# Patient Record
Sex: FEMALE | Race: WHITE | NOT HISPANIC OR LATINO | Employment: FULL TIME | URBAN - METROPOLITAN AREA
[De-identification: names, ages, dates, MRNs, and addresses within clinical notes are randomized per-mention and may not be internally consistent; named-entity substitution may affect disease eponyms.]

---
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
## 2022-01-10 ENCOUNTER — OFFICE VISIT (OUTPATIENT)
Dept: FAMILY MEDICINE CLINIC | Facility: CLINIC | Age: 22
End: 2022-01-10

## 2022-01-10 VITALS
HEART RATE: 90 BPM | SYSTOLIC BLOOD PRESSURE: 130 MMHG | HEIGHT: 68 IN | BODY MASS INDEX: 26.86 KG/M2 | TEMPERATURE: 96.8 F | WEIGHT: 177.2 LBS | DIASTOLIC BLOOD PRESSURE: 77 MMHG | OXYGEN SATURATION: 97 %

## 2022-01-10 DIAGNOSIS — F90.2 ATTENTION DEFICIT HYPERACTIVITY DISORDER (ADHD), COMBINED TYPE: ICD-10-CM

## 2022-01-10 DIAGNOSIS — Z30.09 ENCOUNTER FOR OTHER GENERAL COUNSELING AND ADVICE ON CONTRACEPTION: ICD-10-CM

## 2022-01-10 DIAGNOSIS — K31.84 GASTROPARESIS: Primary | ICD-10-CM

## 2022-01-10 DIAGNOSIS — Z84.81 FH: BRCA GENE POSITIVE: ICD-10-CM

## 2022-01-10 PROCEDURE — 99203 OFFICE O/P NEW LOW 30 MIN: CPT | Performed by: FAMILY MEDICINE

## 2022-01-10 RX ORDER — FAMOTIDINE 40 MG/1
40 TABLET, FILM COATED ORAL DAILY
Qty: 90 TABLET | Refills: 0 | Status: SHIPPED | OUTPATIENT
Start: 2022-01-10 | End: 2022-05-10

## 2022-01-10 NOTE — PROGRESS NOTES
"Chief Complaint  Contraception (having issues) and Vomiting (daily in mornings)    Subjective          Nadeem Hernández presents to [facility]  History of Present Illness came here to establish care and discuss contraception, patient also giving history of gastroparesis complaining of vomiting early morning.  She is sexually active took 2 urine pregnancy test at home and both are negative.  She had EGD done few years ago.  She also had a gastric emptying done at the same time.  She was taking erythromycin 4 times a day for gastroparesis.  Patient is also requesting removal of IUD.  She is also requesting a genetic testing for BRCA 2 gene.  Grandmother diagnosed with BRCA 2 genes and breast cancer.  She also has a history of endometrial hyperplasia and had D&C done at age 16.  Patient also wanted to discuss her symptoms of ADHD, history of ADHD was on Concerta.  Quit taking Concerta after school.  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  She is also complaining of increased anxiety denies any depression.  Family history of bipolar disorder.  Objective   Vital Signs:   /77   Pulse 90   Temp 96.8 °F (36 °C)   Ht 172.7 cm (67.99\")   Wt 80.4 kg (177 lb 3.2 oz)   SpO2 97%   BMI 26.95 kg/m²     Physical Exam  Constitutional:       General: She is not in acute distress.     Appearance: Normal appearance. She is well-developed.   HENT:      Head: Normocephalic and atraumatic.      Right Ear: Tympanic membrane normal.      Left Ear: Tympanic membrane normal.      Mouth/Throat:      Mouth: Mucous membranes are moist.   Eyes:      General:         Right eye: No discharge.         Left eye: No discharge.      Extraocular Movements: Extraocular movements intact.      Pupils: Pupils are equal, round, and reactive to light.   Cardiovascular:      Rate and Rhythm: Normal rate and regular rhythm.      Pulses: Normal pulses.      Heart " sounds: Normal heart sounds.   Pulmonary:      Effort: Pulmonary effort is normal.      Breath sounds: Normal breath sounds. No wheezing or rales.   Abdominal:      General: Bowel sounds are normal.      Palpations: Abdomen is soft. There is no mass.      Tenderness: There is no abdominal tenderness.   Musculoskeletal:      Cervical back: Normal range of motion and neck supple.      Right lower leg: No edema.      Left lower leg: No edema.   Lymphadenopathy:      Cervical: No cervical adenopathy.   Neurological:      General: No focal deficit present.      Mental Status: She is alert and oriented to person, place, and time.        Result Review :       Data reviewed: Consultant notes gi  and GI studies egd, gastric emptying          Assessment and Plan    Diagnoses and all orders for this visit:    1. Gastroparesis (Primary)    2. Encounter for other general counseling and advice on contraception  -     Ambulatory Referral to Obstetrics / Gynecology    3. Attention deficit hyperactivity disorder (ADHD), combined type    4. FH: BRCA gene positive  -     Ambulatory Referral to Genetics    Other orders  -     famotidine (Pepcid) 40 MG tablet; Take 1 tablet by mouth Daily.  Dispense: 90 tablet; Refill: 0         Nadeem Hernández is a 21-year-old female patient came here to establish care and follow-up on  Gastroparesis, causes of gastroparesis discussed with patient she already has a scheduled appointment with GI.   diet changes discussed with patient.  We will also start on Pepcid.  Contraceptive advised, patient requesting removal of IUD.  She has Mirena IUD  almost 5 years ago.  She is complaining of increased bloating.  I advised her to have her follow-up with OB to get it removed was also start some alternative method of contraception.  Patient is reluctant to start any other medication for now.  Family history of BRCA2, will refer for genetic testing.  ADHD, information on psychological testing given to patient.   She will come back after the psychological testing and evaluation.        Follow Up   No follow-ups on file.  Patient was given instructions and counseling regarding her condition or for health maintenance advice. Please see specific information pulled into the AVS if appropriate.

## 2022-02-02 ENCOUNTER — OFFICE VISIT (OUTPATIENT)
Dept: OBSTETRICS AND GYNECOLOGY | Age: 22
End: 2022-02-02

## 2022-02-02 VITALS
SYSTOLIC BLOOD PRESSURE: 110 MMHG | DIASTOLIC BLOOD PRESSURE: 68 MMHG | BODY MASS INDEX: 27.13 KG/M2 | HEIGHT: 68 IN | WEIGHT: 179 LBS

## 2022-02-02 DIAGNOSIS — Z11.3 SCREEN FOR STD (SEXUALLY TRANSMITTED DISEASE): ICD-10-CM

## 2022-02-02 DIAGNOSIS — Z01.419 WELL WOMAN EXAM WITH ROUTINE GYNECOLOGICAL EXAM: Primary | ICD-10-CM

## 2022-02-02 DIAGNOSIS — F17.210 CIGARETTE SMOKER: ICD-10-CM

## 2022-02-02 DIAGNOSIS — Z30.011 VISIT FOR ORAL CONTRACEPTIVE PRESCRIPTION: ICD-10-CM

## 2022-02-02 DIAGNOSIS — Z30.432 ENCOUNTER FOR IUD REMOVAL: ICD-10-CM

## 2022-02-02 DIAGNOSIS — Z80.41 FAMILY HISTORY OF OVARIAN CANCER: ICD-10-CM

## 2022-02-02 PROCEDURE — 99385 PREV VISIT NEW AGE 18-39: CPT | Performed by: NURSE PRACTITIONER

## 2022-02-02 PROCEDURE — 58301 REMOVE INTRAUTERINE DEVICE: CPT | Performed by: NURSE PRACTITIONER

## 2022-02-02 RX ORDER — ACETAMINOPHEN AND CODEINE PHOSPHATE 120; 12 MG/5ML; MG/5ML
1 SOLUTION ORAL DAILY
Qty: 28 TABLET | Refills: 12 | Status: SHIPPED | OUTPATIENT
Start: 2022-02-02 | End: 2023-03-17

## 2022-02-02 NOTE — PROGRESS NOTES
Subjective     Chief Complaint   Patient presents with   • Gynecologic Exam     last pap 12/2021 normal   • Procedure     Remove IUD       History of Present Illness    Nadeem Hernández is a 21 y.o. No obstetric history on file. who presents for annual exam.    New GYN  Here for annual exam  Pt working at [employer], going to start nursing at [employer]  Wants IUD removed - has had for 5 years. Discussed approval for 7 years.  She is SA currently, using condoms  Paternal grandmother has + BRCA gene and ovarian cancer  She was told she had a blood clotting d/o in the past  PCP has referred her to genetics  Rec progestin only options  No other GYN concerns or complaints today     Obstetric History:  OB History    No obstetric history on file.        Menstrual History:     Patient's last menstrual period was 01/19/2022.         Current contraception: IUD  History of abnormal Pap smear: no  Received Gardasil immunization: yes  Perform regular self breast exam: no  Family history of uterine or ovarian cancer: yes - see hx  Family History of colon cancer: no  Family history of breast cancer: no    Mammogram: not indicated.  Colonoscopy: not indicated.  DEXA: not indicated.    Exercise: moderately active  Calcium/Vitamin D: adequate intake    The following portions of the patient's history were reviewed and updated as appropriate: allergies, current medications, past family history, past medical history, past social history, past surgical history and problem list.    Review of Systems   Constitutional: Negative.    Respiratory: Negative.    Cardiovascular: Negative.    Gastrointestinal: Negative.    Genitourinary: Negative.    Skin: Negative.    Psychiatric/Behavioral: Negative.            Objective   Physical Exam  Constitutional:       General: She is awake.      Appearance: Normal appearance. She is well-developed.   HENT:      Head: Normocephalic and atraumatic.      Nose: Nose normal.   Neck:      Thyroid: No thyroid mass,  thyromegaly or thyroid tenderness.   Cardiovascular:      Rate and Rhythm: Normal rate and regular rhythm.      Pulses: Normal pulses.      Heart sounds: Normal heart sounds.   Pulmonary:      Effort: Pulmonary effort is normal.      Breath sounds: Normal breath sounds.   Chest:   Breasts: Breasts are symmetrical.      Right: Normal. No swelling, bleeding, inverted nipple, mass, nipple discharge, skin change, tenderness or supraclavicular adenopathy.      Left: Normal. No swelling, bleeding, inverted nipple, mass, nipple discharge, skin change, tenderness or supraclavicular adenopathy.       Abdominal:      General: Abdomen is flat. Bowel sounds are normal.      Palpations: Abdomen is soft.      Tenderness: There is no abdominal tenderness.   Genitourinary:     General: Normal vulva.      Labia:         Right: No rash, tenderness, lesion or injury.         Left: No rash, tenderness, lesion or injury.       Urethra: No prolapse, urethral pain, urethral swelling or urethral lesion.      Vagina: Normal. No signs of injury. No vaginal discharge, erythema, tenderness, bleeding, lesions or prolapsed vaginal walls.      Cervix: No discharge, friability, lesion, erythema or cervical bleeding.      Uterus: Normal. Not enlarged, not tender and no uterine prolapse.       Adnexa: Right adnexa normal and left adnexa normal.        Right: No mass, tenderness or fullness.          Left: No mass, tenderness or fullness.        Rectum: Normal. No mass.      Comments: IUD string seen  Musculoskeletal:      Cervical back: Normal range of motion and neck supple.   Lymphadenopathy:      Upper Body:      Right upper body: No supraclavicular adenopathy.      Left upper body: No supraclavicular adenopathy.   Skin:     General: Skin is warm and dry.   Neurological:      General: No focal deficit present.      Mental Status: She is alert and oriented to person, place, and time.   Psychiatric:         Mood and Affect: Mood normal.          "Behavior: Behavior normal. Behavior is cooperative.         Thought Content: Thought content normal.         Judgment: Judgment normal.         /68   Ht 172.7 cm (68\")   Wt 81.2 kg (179 lb)   LMP 01/19/2022   BMI 27.22 kg/m²     Assessment/Plan   Diagnoses and all orders for this visit:    1. Well woman exam with routine gynecological exam (Primary)    2. Screen for STD (sexually transmitted disease)  -     Chlamydia trachomatis, Neisseria gonorrhoeae, Trichomonas vaginalis, PCR - Swab, Cervix    3. Encounter for IUD removal    4. Visit for oral contraceptive prescription    5. Family history of ovarian cancer    6. Cigarette smoker    Other orders  -     norethindrone (MICRONOR) 0.35 MG tablet; Take 1 tablet by mouth Daily.  Dispense: 28 tablet; Refill: 12        All questions answered.  Breast self exam technique reviewed and patient encouraged to perform self-exam monthly.  Discussed healthy lifestyle modifications.  Recommended 30 minutes of aerobic exercise five times per week.  Discussed calcium needs to prevent osteoporosis.    -Pap not indicated  -STD screening  -IUD removal   -Enc smoking cessation  -Enc condom use  -Discussed progestin only options. She would like to start progestin only pill. Discussed r/b/a, use.        IUD Removal    Date of procedure:  2/2/2022    Risks and benefits discussed? yes  All questions answered? yes  Consents given by The patient  Reason for removal: cramping        Procedure documentation:    A speculum was placed in order to view the cervix.  .  The IUD string was easily seen.  The string was grasped and the IUD was removed without difficulty.  The IUD did not appear to be adherent to the uterine cavity. It was removed intact.    She tolerated the procedure without any difficulty.     Post procedure instructions: Patient notified to call with heavy bleeding, fever or increasing pain.    Follow up needed: PRN                "

## 2022-02-04 LAB
C TRACH RRNA SPEC QL NAA+PROBE: NEGATIVE
N GONORRHOEA RRNA SPEC QL NAA+PROBE: NEGATIVE
T VAGINALIS DNA SPEC QL NAA+PROBE: POSITIVE

## 2022-02-04 RX ORDER — METRONIDAZOLE 500 MG/1
500 TABLET ORAL 2 TIMES DAILY
Qty: 14 TABLET | Refills: 0 | Status: SHIPPED | OUTPATIENT
Start: 2022-02-04 | End: 2022-02-11

## 2022-02-16 ENCOUNTER — TELEPHONE (OUTPATIENT)
Dept: FAMILY MEDICINE CLINIC | Facility: CLINIC | Age: 22
End: 2022-02-16

## 2022-02-16 NOTE — TELEPHONE ENCOUNTER
Caller: Nadeem Hernández    Relationship to patient: Self    Best call back number: [phone]    Patient is needing: PT IS CALLING IN REGARDS TO THE PEPCID THAT WAS PRESCRIBED TO HER. SHE STATED THAT IT HELPED FOR JUST A COUPLE OF DAYS BUT IT HAS NOW STOPPED AND HER VOMITING IS BACK. PLEASE CALL WHEN POSSIBLE

## 2022-02-17 NOTE — TELEPHONE ENCOUNTER
Called and left .    Hub please inform patient if call back:    Dr. Reed wants her to restart the pepcid, take it twice a day. If no improvement she needs to come in. Make sure to drink plenty of fluids (Water, Gatorade, etc.). Has she gotten in with Gastro yet?

## 2022-04-06 ENCOUNTER — TELEPHONE (OUTPATIENT)
Dept: FAMILY MEDICINE CLINIC | Facility: CLINIC | Age: 22
End: 2022-04-06

## 2022-04-06 NOTE — TELEPHONE ENCOUNTER
Caller: Nadeem Hernández    Relationship to patient: Self    Best call back number: [phone]    Patient is needing: PATIENT STATES SHE COLLAPSED ON Friday 4-1-22 AND AGAIN ON Saturday 4-2-22.   PATIENT STATES SHE STILL IS HAVING VOMITING, MEDICATION HAS NOT HELPED.   PLEASE ADVISE

## 2022-04-07 NOTE — TELEPHONE ENCOUNTER
Reached back out to patient.  Hub please inform patient if call back:      Derek Fountain states she needs to contact her GI in regards to this issue as it may be her gastroparesis

## 2022-05-10 RX ORDER — FAMOTIDINE 40 MG/1
40 TABLET, FILM COATED ORAL DAILY
Qty: 90 TABLET | Refills: 0 | Status: SHIPPED | OUTPATIENT
Start: 2022-05-10

## 2022-05-10 NOTE — TELEPHONE ENCOUNTER
Rx Refill Note  Requested Prescriptions     Pending Prescriptions Disp Refills   • famotidine (PEPCID) 40 MG tablet [Pharmacy Med Name: FAMOTIDINE 40MG TABLETS] 90 tablet 0     Sig: TAKE 1 TABLET BY MOUTH DAILY      Last office visit with prescribing clinician: 1/10/2022      Next office visit with prescribing clinician: Visit date not found            Abimbola Wasserman MA  05/10/22, 10:17 EDT

## 2023-03-17 RX ORDER — ACETAMINOPHEN AND CODEINE PHOSPHATE 120; 12 MG/5ML; MG/5ML
1 SOLUTION ORAL DAILY
Qty: 28 TABLET | Refills: 0 | Status: SHIPPED | OUTPATIENT
Start: 2023-03-17 | End: 2024-03-16

## 2023-03-17 RX ORDER — ACETAMINOPHEN AND CODEINE PHOSPHATE 120; 12 MG/5ML; MG/5ML
1 SOLUTION ORAL DAILY
Qty: 84 TABLET | OUTPATIENT
Start: 2023-03-17 | End: 2024-03-16

## 2023-06-28 PROBLEM — J45.20 MILD INTERMITTENT ASTHMA WITHOUT COMPLICATION: Status: ACTIVE | Noted: 2023-06-28

## 2023-07-27 ENCOUNTER — TELEPHONE (OUTPATIENT)
Dept: FAMILY MEDICINE CLINIC | Facility: CLINIC | Age: 23
End: 2023-07-27

## 2023-07-27 NOTE — TELEPHONE ENCOUNTER
Pt states she was never contacted about referral for physical therapy. Pt is requesting a new referral be sent to [facility]. Fax number is [phone]    Please advise

## 2023-08-08 ENCOUNTER — TELEPHONE (OUTPATIENT)
Dept: FAMILY MEDICINE CLINIC | Facility: CLINIC | Age: 23
End: 2023-08-08
Payer: COMMERCIAL

## 2023-08-08 DIAGNOSIS — M54.50 ACUTE BILATERAL LOW BACK PAIN WITHOUT SCIATICA: Primary | ICD-10-CM

## 2023-08-08 NOTE — TELEPHONE ENCOUNTER
Pt requesting referral to [facility] for Back Pain.

## 2025-03-10 PROCEDURE — 0202U NFCT DS 22 TRGT SARS-COV-2: CPT | Performed by: EMERGENCY MEDICINE

## 2025-03-10 PROCEDURE — 99283 EMERGENCY DEPT VISIT LOW MDM: CPT

## 2025-03-11 ENCOUNTER — HOSPITAL ENCOUNTER (EMERGENCY)
Facility: HOSPITAL | Age: 25
Discharge: HOME | End: 2025-03-11
Attending: EMERGENCY MEDICINE | Admitting: EMERGENCY MEDICINE
Payer: COMMERCIAL

## 2025-03-11 ENCOUNTER — APPOINTMENT (OUTPATIENT)
Dept: GENERAL RADIOLOGY | Facility: HOSPITAL | Age: 25
End: 2025-03-11
Payer: COMMERCIAL

## 2025-03-11 VITALS
OXYGEN SATURATION: 97 % | BODY MASS INDEX: 22.5 KG/M2 | HEIGHT: 66 IN | DIASTOLIC BLOOD PRESSURE: 75 MMHG | RESPIRATION RATE: 16 BRPM | TEMPERATURE: 97.8 F | WEIGHT: 140 LBS | HEART RATE: 66 BPM | SYSTOLIC BLOOD PRESSURE: 110 MMHG

## 2025-03-11 DIAGNOSIS — J10.1 INFLUENZA A: Primary | ICD-10-CM

## 2025-03-11 DIAGNOSIS — R09.1 PLEURISY: ICD-10-CM

## 2025-03-11 LAB
ALBUMIN SERPL-MCNC: 4.1 G/DL (ref 3.5–5.2)
ALBUMIN/GLOB SERPL: 1.1 G/DL
ALP SERPL-CCNC: 53 U/L (ref 39–117)
ALT SERPL W P-5'-P-CCNC: 15 U/L (ref 1–33)
ANION GAP SERPL CALCULATED.3IONS-SCNC: 8.8 MMOL/L (ref 5–15)
AST SERPL-CCNC: 21 U/L (ref 1–32)
B PARAPERT DNA SPEC QL NAA+PROBE: NOT DETECTED
B PERT DNA SPEC QL NAA+PROBE: NOT DETECTED
BASOPHILS # BLD AUTO: 0.01 10*3/MM3 (ref 0–0.2)
BASOPHILS NFR BLD AUTO: 0.1 % (ref 0–1.5)
BILIRUB SERPL-MCNC: 0.3 MG/DL (ref 0–1.2)
BUN SERPL-MCNC: 10 MG/DL (ref 6–20)
BUN/CREAT SERPL: 12 (ref 7–25)
C PNEUM DNA NPH QL NAA+NON-PROBE: NOT DETECTED
CALCIUM SPEC-SCNC: 9.7 MG/DL (ref 8.6–10.5)
CHLORIDE SERPL-SCNC: 103 MMOL/L (ref 98–107)
CO2 SERPL-SCNC: 27.2 MMOL/L (ref 22–29)
CREAT SERPL-MCNC: 0.83 MG/DL (ref 0.57–1)
D DIMER PPP FEU-MCNC: <0.27 MCGFEU/ML (ref 0–0.5)
DEPRECATED RDW RBC AUTO: 39.1 FL (ref 37–54)
EGFRCR SERPLBLD CKD-EPI 2021: 101.7 ML/MIN/1.73
EOSINOPHIL # BLD AUTO: 0.08 10*3/MM3 (ref 0–0.4)
EOSINOPHIL NFR BLD AUTO: 1.1 % (ref 0.3–6.2)
ERYTHROCYTE [DISTWIDTH] IN BLOOD BY AUTOMATED COUNT: 12.1 % (ref 12.3–15.4)
FLUAV H1 2009 PAND RNA NPH QL NAA+PROBE: DETECTED
FLUBV RNA ISLT QL NAA+PROBE: NOT DETECTED
GLOBULIN UR ELPH-MCNC: 3.7 GM/DL
GLUCOSE SERPL-MCNC: 102 MG/DL (ref 65–99)
HADV DNA SPEC NAA+PROBE: NOT DETECTED
HCG SERPL QL: NEGATIVE
HCOV 229E RNA SPEC QL NAA+PROBE: NOT DETECTED
HCOV HKU1 RNA SPEC QL NAA+PROBE: NOT DETECTED
HCOV NL63 RNA SPEC QL NAA+PROBE: NOT DETECTED
HCOV OC43 RNA SPEC QL NAA+PROBE: NOT DETECTED
HCT VFR BLD AUTO: 41.9 % (ref 34–46.6)
HGB BLD-MCNC: 14.2 G/DL (ref 12–15.9)
HMPV RNA NPH QL NAA+NON-PROBE: NOT DETECTED
HPIV1 RNA ISLT QL NAA+PROBE: NOT DETECTED
HPIV2 RNA SPEC QL NAA+PROBE: NOT DETECTED
HPIV3 RNA NPH QL NAA+PROBE: NOT DETECTED
HPIV4 P GENE NPH QL NAA+PROBE: NOT DETECTED
IMM GRANULOCYTES # BLD AUTO: 0.01 10*3/MM3 (ref 0–0.05)
IMM GRANULOCYTES NFR BLD AUTO: 0.1 % (ref 0–0.5)
LYMPHOCYTES # BLD AUTO: 2.61 10*3/MM3 (ref 0.7–3.1)
LYMPHOCYTES NFR BLD AUTO: 35.3 % (ref 19.6–45.3)
M PNEUMO IGG SER IA-ACNC: NOT DETECTED
MCH RBC QN AUTO: 29.6 PG (ref 26.6–33)
MCHC RBC AUTO-ENTMCNC: 33.9 G/DL (ref 31.5–35.7)
MCV RBC AUTO: 87.5 FL (ref 79–97)
MONOCYTES # BLD AUTO: 0.58 10*3/MM3 (ref 0.1–0.9)
MONOCYTES NFR BLD AUTO: 7.8 % (ref 5–12)
NEUTROPHILS NFR BLD AUTO: 4.11 10*3/MM3 (ref 1.7–7)
NEUTROPHILS NFR BLD AUTO: 55.6 % (ref 42.7–76)
NRBC BLD AUTO-RTO: 0 /100 WBC (ref 0–0.2)
PLATELET # BLD AUTO: 287 10*3/MM3 (ref 140–450)
PMV BLD AUTO: 9.9 FL (ref 6–12)
POTASSIUM SERPL-SCNC: 3.9 MMOL/L (ref 3.5–5.2)
PROT SERPL-MCNC: 7.8 G/DL (ref 6–8.5)
RBC # BLD AUTO: 4.79 10*6/MM3 (ref 3.77–5.28)
RHINOVIRUS RNA SPEC NAA+PROBE: NOT DETECTED
RSV RNA NPH QL NAA+NON-PROBE: NOT DETECTED
SARS-COV-2 RNA NPH QL NAA+NON-PROBE: NOT DETECTED
SODIUM SERPL-SCNC: 139 MMOL/L (ref 136–145)
WBC NRBC COR # BLD AUTO: 7.4 10*3/MM3 (ref 3.4–10.8)

## 2025-03-11 PROCEDURE — 71045 X-RAY EXAM CHEST 1 VIEW: CPT

## 2025-03-11 PROCEDURE — 85379 FIBRIN DEGRADATION QUANT: CPT | Performed by: EMERGENCY MEDICINE

## 2025-03-11 PROCEDURE — 84703 CHORIONIC GONADOTROPIN ASSAY: CPT | Performed by: EMERGENCY MEDICINE

## 2025-03-11 PROCEDURE — 80053 COMPREHEN METABOLIC PANEL: CPT | Performed by: EMERGENCY MEDICINE

## 2025-03-11 PROCEDURE — 85025 COMPLETE CBC W/AUTO DIFF WBC: CPT | Performed by: EMERGENCY MEDICINE

## 2025-03-11 NOTE — Clinical Note
[facility] EMERGENCY DEPARTMENT  [address] [facility] [address]  Phone: [phone]    Nadeem Hernández was seen and treated in our emergency department on 3/10/2025.  She may return to work on 03/13/2025.         Thank you for choosing [facility].    Arash Valerio MD

## 2025-03-11 NOTE — ED PROVIDER NOTES
ED Course as of 03/11/25 0534   e Mar 11, 2025   0044 Influenza A H1 2009 PCR(!): Detected [MP]   0200 HCG Qualitative: Negative [JR]   0200 Chest x-ray dependently interpreted PACS and demonstrates no infiltrate or pneumothorax. [JR]   0238 D-Dimer, Quant: <0.27 [MP]   0532 Reassessed patient.  She is hemodynamically stable.  D-dimer negative, therefore no need for CTA.  Discussed supportive care at home.  Encouraged her to follow-up with PCP and discussed ED return precautions.  She is otherwise well-appearing, hemodynamically stable, and therefore appropriate for discharge. [MP]      ED Course User Index  [JR] Arash Valerio MD  [MP] Vannesa Duran, Vannesa Villalta PA-C  03/11/25 0534

## 2025-03-11 NOTE — ED PROVIDER NOTES
EMERGENCY DEPARTMENT ENCOUNTER  Room Number:  20/20  PCP: Carmelita Reed MD  Independent Historians: Patient      HPI:  Chief Complaint: had concerns including URI and Pain With Breathing.     A complete HPI/ROS/PMH/PSH/SH/FH are unobtainable due to: Nothing      Context: Nadeem Hernández is a 25 y.o. female with a medical history of asthma, prothrombin gene mutation, who presents to the ED c/o acute bodyaches and pain in her ribs with breathing earlier today.  She states that she just developed a sore throat and pain with breathing today.  2 days ago she woke up with severe pain in her legs bilaterally.  She states that pain has improved and she denies leg swelling.  She denies feeling short of breath.  She states that she will feel hot and cold at times.  She denies palpitations, syncope, near syncope.  The pain was in both sides of her ribs but has improved now.  She denies history of DVT or PE, however she does have a prothrombin gene mutation.  Her father has a history of blood clots and she had undergone hypercoagulable workup prior to initiating birth control.  She has been told that she can only take progesterone only birth control.  She denies being pregnant.      Review of prior external notes (non-ED) -and- Review of prior external test results outside of this encounter: I reviewed PCP visit from 12/6/2024.  Patient was complaining of nausea and vomiting.  She has mild gastroparesis.        PAST MEDICAL HISTORY  Active Ambulatory Problems     Diagnosis Date Noted    Mild intermittent asthma without complication 06/28/2023     Resolved Ambulatory Problems     Diagnosis Date Noted    No Resolved Ambulatory Problems     Past Medical History:   Diagnosis Date    Endometrial hyperplasia     Endometrial polyp          PAST SURGICAL HISTORY  Past Surgical History:   Procedure Laterality Date    DILATATION AND CURETTAGE           FAMILY HISTORY  Family History   Problem Relation Age of Onset    Bipolar  disorder Mother     Hypertension Father     Cancer Maternal Grandmother     BRCA 1/2 Paternal Grandmother     Ovarian cancer Paternal Grandmother          SOCIAL HISTORY  Social History     Socioeconomic History    Marital status: Single   Tobacco Use    Smoking status: Every Day    Smokeless tobacco: Never   Substance and Sexual Activity    Alcohol use: Never    Drug use: Never    Sexual activity: Yes     Partners: Male     Birth control/protection: I.U.D.         ALLERGIES  Patient has no known allergies.      REVIEW OF SYSTEMS  Review of all 14 systems is negative other than stated in the HPI above.      PHYSICAL EXAM    I have reviewed the triage vital signs and nursing notes.    ED Triage Vitals   Temp Heart Rate Resp BP SpO2   03/10/25 2229 03/10/25 2229 03/10/25 2229 03/10/25 2244 03/10/25 2229   97.2 °F (36.2 °C) 65 17 125/83 95 %      Temp src Heart Rate Source Patient Position BP Location FiO2 (%)   -- -- 03/10/25 2244 03/10/25 2244 --     Sitting Right arm          GENERAL: awake and alert, no acute distress  HENT: Normocephalic, atraumatic  EYES: no scleral icterus  CV: regular rhythm, regular rate  RESPIRATORY: normal effort, lungs clear to auscultation bilaterally, no wheezing  ABDOMEN: soft, nondistended, nontender throughout  MUSCULOSKELETAL: no deformity, no calf tenderness bilaterally, no lower extremity edema  NEURO: alert, moves all extremities, follows commands, cranial nerves II through XII intact, speech fluent and clear  PSYCH: calm, cooperative  SKIN: Warm, dry          LAB RESULTS  Recent Results (from the past 24 hours)   Respiratory Panel PCR w/COVID-19(SARS-CoV-2) ELOISE/ROVERTO/ROXANNE/PAD/COR/NIKOLAI In-House, NP Swab in UTM/VTM, 2 HR TAT - Swab, Nasopharynx    Collection Time: 03/10/25 10:48 PM    Specimen: Nasopharynx; Swab   Result Value Ref Range    ADENOVIRUS, PCR Not Detected Not Detected    Coronavirus 229E Not Detected Not Detected    Coronavirus HKU1 Not Detected Not Detected    Coronavirus  NL63 Not Detected Not Detected    Coronavirus OC43 Not Detected Not Detected    COVID19 Not Detected Not Detected - Ref. Range    Human Metapneumovirus Not Detected Not Detected    Human Rhinovirus/Enterovirus Not Detected Not Detected    Influenza A H1 2009 PCR Detected (A) Not Detected    Influenza B PCR Not Detected Not Detected    Parainfluenza Virus 1 Not Detected Not Detected    Parainfluenza Virus 2 Not Detected Not Detected    Parainfluenza Virus 3 Not Detected Not Detected    Parainfluenza Virus 4 Not Detected Not Detected    RSV, PCR Not Detected Not Detected    Bordetella pertussis pcr Not Detected Not Detected    Bordetella parapertussis PCR Not Detected Not Detected    Chlamydophila pneumoniae PCR Not Detected Not Detected    Mycoplasma pneumo by PCR Not Detected Not Detected       The above labs were ordered by me and independently reviewed by me.     RADIOLOGY  No Radiology Exams Resulted Within Past 24 Hours    The above radiology studies were ordered by me.  See ED course for independent interpretations.     MEDICATIONS GIVEN IN ER  Medications - No data to display      ORDERS PLACED DURING THIS VISIT:  Orders Placed This Encounter   Procedures    Respiratory Panel PCR w/COVID-19(SARS-CoV-2) ELOISE/ROVERTO/ROXANNE/PAD/COR/NIKOLAI In-House, NP Swab in UTM/VTM, 2 HR TAT - Swab, Nasopharynx    XR Chest 1 View    Comprehensive Metabolic Panel    hCG, Serum, Qualitative    D-dimer, Quantitative    CBC Auto Differential    CBC & Differential         OUTPATIENT MEDICATION MANAGEMENT:  No current Epic-ordered facility-administered medications on file.     Current Outpatient Medications Ordered in Epic   Medication Sig Dispense Refill    albuterol sulfate HFA (Proventil HFA) 108 (90 Base) MCG/ACT inhaler Inhale 2 puffs Every 6 (Six) Hours As Needed for Wheezing or Shortness of Air. 18 g 3    buPROPion (WELLBUTRIN) 100 MG tablet Take 75 mg by mouth 2 (Two) Times a Day.      hydrOXYzine (ATARAX) 50 MG tablet Take 1 tablet by  mouth 3 (Three) Times a Day As Needed for Itching.           PROCEDURES  Procedures            PROGRESS, DATA ANALYSIS, CONSULTS, AND MEDICAL DECISION MAKING  All labs have been independently interpreted by me.  All radiology studies have been reviewed by me. All EKG's have been independently viewed and interpreted by me.  Discussion below represents my analysis of pertinent findings related to patient's condition, differential diagnosis, treatment plan and final disposition.    Differential diagnosis includes but is not limited to:  Pleurisy  Pneumonia  Pleural effusion  Pneumothorax  Pulmonary embolism      Clinical Scores:                  ED Course as of 03/14/25 1159   Tue Mar 11, 2025   0044 Influenza A H1 2009 PCR(!): Detected [MP]   0200 HCG Qualitative: Negative [JR]   0200 Chest x-ray dependently interpreted PACS and demonstrates no infiltrate or pneumothorax. [JR]   0238 D-Dimer, Quant: <0.27 [MP]   0532 Reassessed patient.  She is hemodynamically stable.  D-dimer negative, therefore no need for CTA.  Discussed supportive care at home.  Encouraged her to follow-up with PCP and discussed ED return precautions.  She is otherwise well-appearing, hemodynamically stable, and therefore appropriate for discharge. [MP]      ED Course User Index  [JR] Arash Valerio MD  [MP] Vannesa Duran PA-C             AS OF 11:59 EDT VITALS:    BP - 110/75  HR - 66  TEMP - 97.8 °F (36.6 °C) (Tympanic)  O2 SATS - 97%    COMPLEXITY OF CARE        Chronic or social conditions impacting patient care (Social Determinants of Health):     DIAGNOSIS  Final diagnoses:   Influenza A   Pleurisy           DISPOSITION  DISCHARGE    Patient discharged in stable condition.    Reviewed implications of results, diagnosis, meds, responsibility to follow up, warning signs and symptoms of possible worsening, potential complications and reasons to return to ER.    Patient/Family voiced understanding of above instructions.    Discussed  plan for discharge, as there is no emergent indication for admission. Patient referred to primary care provider for BP management due to today's BP. Pt/family is agreeable and understands need for follow up and repeat testing.  Pt is aware that discharge does not mean that nothing is wrong but it indicates no emergency is present that requires admission and they must continue care with follow-up as given below or physician of their choice.     FOLLOW-UP  Carmelita Reed MD  [address]  [place] [address]  [phone]    Schedule an appointment as soon as possible for a visit            Medication List      No changes were made to your prescriptions during this visit.             Prescription drug monitoring program review:           Please note that portions of this document were completed with a voice recognition program.    Note Disclaimer: At [facility], we believe that sharing information builds trust and better relationships. You are receiving this note because you recently visited [facility]. It is possible you will see health information before a provider has talked with you about it. This kind of information can be easy to misunderstand. To help you fully understand what it means for your health, we urge you to discuss this note with your provider.         Arash Valerio MD  03/14/25 1200

## 2025-03-11 NOTE — Clinical Note
[facility] EMERGENCY DEPARTMENT  [address] [facility] [address]  Phone: [phone]    Nadeem Hernández was seen and treated in our emergency department on 3/10/2025.  She may return to work on 03/13/2025.         Thank you for choosing [facility].    Arash Valerio MD